# Patient Record
Sex: MALE | HISPANIC OR LATINO | Employment: FULL TIME | URBAN - METROPOLITAN AREA
[De-identification: names, ages, dates, MRNs, and addresses within clinical notes are randomized per-mention and may not be internally consistent; named-entity substitution may affect disease eponyms.]

---

## 2022-02-25 ENCOUNTER — OFFICE VISIT (OUTPATIENT)
Dept: URGENT CARE | Facility: CLINIC | Age: 37
End: 2022-02-25

## 2022-02-25 VITALS
BODY MASS INDEX: 25.1 KG/M2 | RESPIRATION RATE: 16 BRPM | DIASTOLIC BLOOD PRESSURE: 80 MMHG | SYSTOLIC BLOOD PRESSURE: 110 MMHG | HEIGHT: 66 IN | TEMPERATURE: 98.4 F | OXYGEN SATURATION: 98 % | HEART RATE: 74 BPM | WEIGHT: 156.2 LBS

## 2022-02-25 DIAGNOSIS — R21 SKIN RASH: ICD-10-CM

## 2022-02-25 DIAGNOSIS — L08.9 INFECTION OF SKIN: ICD-10-CM

## 2022-02-25 PROCEDURE — 99203 OFFICE O/P NEW LOW 30 MIN: CPT | Performed by: PHYSICIAN ASSISTANT

## 2022-02-25 RX ORDER — CEPHALEXIN 500 MG/1
500 CAPSULE ORAL 4 TIMES DAILY
Qty: 28 CAPSULE | Refills: 0 | Status: SHIPPED | OUTPATIENT
Start: 2022-02-25 | End: 2022-03-04

## 2022-02-25 ASSESSMENT — ENCOUNTER SYMPTOMS
EYE REDNESS: 0
COUGH: 0
FEVER: 0
VOMITING: 0
SORE THROAT: 0
HEADACHES: 0
SHORTNESS OF BREATH: 0
NAUSEA: 0
EYE DISCHARGE: 0

## 2022-02-25 NOTE — PROGRESS NOTES
Subjective     Nathanael Nova is a 36 y.o. male who presents with Rash (On abd and left arm x 3 months)        This is a new problem.  The patient presents to clinic complaining of a skin rash x3 months.  The patient states he developed a rash to his lower abdomen x3 months ago, which has gradually increased in size.  The patient states the rash subsequently spread to his left upper arm x2 months ago.  The patient describes the rash as itchy with intermittent stinging-like pain.  The patient also notes slight blistering to the rash with intermittent weeping of clear fluid. The patient notes slight erythema surrounding the rash.  He reports no associated swelling. The patient reports no associated fever.  The patient also reports no new exposures to soaps, lotions, or detergents.  The patient states he was seen at an outside clinic approximately 1 week ago, and was prescribed a topical antifungal cream.  The patient states since applying the antifungal cream the rash has become very scaly with cracking.  The patient has taken OTC Advil for his current symptoms.  The patient reports no history of same.  The patient states no one else in his household is experiencing a similar rash.      Rash  This is a new problem. Episode onset: x 3 months. The affected locations include the abdomen and left arm. The rash is characterized by itchiness, scaling, redness and draining. He was exposed to nothing. Pertinent negatives include no congestion, cough, fever, joint pain, shortness of breath, sore throat or vomiting. Treatments tried: The patient was previously prescribed topical antifungal cream.     PMH:  has no past medical history on file.  MEDS:   Current Outpatient Medications:   •  cyclobenzaprine (FLEXERIL) 10 MG TABS, Take 1 Tab by mouth 3 times a day as needed for Mild Pain or Muscle Spasms. (Patient not taking: Reported on 2/25/2022), Disp: 30 Tab, Rfl: 0  ALLERGIES: No Known Allergies  SURGHX: No past surgical history  "on file.  SOCHX:  reports that he has never smoked. He does not have any smokeless tobacco history on file. He reports current alcohol use. He reports that he does not use drugs.  FH: Family history was reviewed, no pertinent findings to report      Review of Systems   Constitutional: Negative for fever.   HENT: Negative for congestion, ear pain and sore throat.    Eyes: Negative for discharge and redness.   Respiratory: Negative for cough and shortness of breath.    Cardiovascular: Negative for chest pain and leg swelling.   Gastrointestinal: Negative for nausea and vomiting.   Musculoskeletal: Negative for joint pain.   Skin: Positive for rash.   Neurological: Negative for headaches.              Objective     /80   Pulse 74   Temp 36.9 °C (98.4 °F) (Temporal)   Resp 16   Ht 1.676 m (5' 6\")   Wt 70.9 kg (156 lb 3.2 oz)   SpO2 98%   BMI 25.21 kg/m²      Physical Exam  Constitutional:       General: He is not in acute distress.     Appearance: Normal appearance. He is well-developed. He is not ill-appearing.   HENT:      Head: Normocephalic and atraumatic.      Right Ear: External ear normal.      Left Ear: External ear normal.   Eyes:      Extraocular Movements: Extraocular movements intact.      Conjunctiva/sclera: Conjunctivae normal.   Cardiovascular:      Rate and Rhythm: Normal rate.   Pulmonary:      Effort: Pulmonary effort is normal.   Musculoskeletal:         General: Normal range of motion.      Cervical back: Normal range of motion and neck supple.   Skin:     General: Skin is warm and dry.             Comments: Lower Abdomen:  A localized area of scaling, hypertrophic skin is present to the patient's lower abdomen with intermittent fissures and slight weeping of clear fluid.  Erythema is present surrounding the localized area of scaling, hypertrophic skin with mild tenderness to palpation and slight increased warmth.  No swelling.  No additional rashes/lesions.  No vesicles.  No pustules.  " No signs of lymphangitis.  Periumbilical Region:  A small area of erythema is present to the infra periumbilical region with slight weeping.  No tenderness palpation.  No swelling.  No increased warmth.  No scaling/hypertrophy.  No vesicles.  No pustules.  Left Upper Arm:  A localized, circular area of scaling, hypertrophic skin is present to the left upper arm with intermittent fissures and slight weeping of clear fluid.  Erythema is present surrounding the localized area of scaling, hypertrophic skin with mild tenderness to palpation and slight increased warmth.  No swelling.  No additional rashes/lesions.  No vesicles.  No pustules.  No signs of lymphangitis.   Neurological:      Mental Status: He is alert and oriented to person, place, and time.                             Assessment & Plan          1. Skin rash  - Referral to establish with [facility]  - Referral to Dermatology    2. Infection of skin  - cephALEXin (KEFLEX) 500 MG Cap; Take 1 Capsule by mouth 4 times a day for 7 days.  Dispense: 28 Capsule; Refill: 0    The patient's presenting symptoms of physical exam endings are consistent with a skin rash.  The patient has been experiencing a skin rash for the past 3 months.  The patient's skin rash initially started on his abdomen, but have subsequently spread to the left upper arm.  The cause of the patient's skin rash is unknown at this time.  The patient was previously prescribed topical ketoconazole cream for his rash, which caused the rash to become scaly with intermittent fissures.  I am concerned the patient's skin rash may be secondarily infected at this time.  Will prescribe the patient cephalexin for his acute secondary infection.  Advised the patient to monitor worsening signs and/or symptoms.  We will place a referral to primary care, as well as dermatology, for further evaluation and management of the patient's skin rash if his symptoms do not improve with the prescribed antibiotics.   Recommend OTC medications and supportive care for symptomatic management.  Recommend the patient follow-up with primary care and dermatology as mentioned above.  Discussed return precautions with the patient, and he verbalized understanding.    Differential diagnoses, supportive care, and indications for immediate follow-up discussed with patient.   Instructed to return to clinic or nearest emergency department for any change in condition, further concerns, or worsening of symptoms.    OTC Tylenol or Motrin for fever/discomfort.  OTC antihistamines for symptomatic relief  OTC topical steroid cream for symptomatic relief  OTC moisturizer for symptomatic relief  Monitor worsening signs and or symptoms  Referral to Dermatology  Referral to Primary Care  Return to clinic or go to the ED if symptoms worsen or fail to improve, or if the patient should develop worsening/increasing/persistent rash, pain/tenderness affected area, swelling, increased redness or warmth, discharge/weeping, fever/chills, secondary signs of infection, and/or any concerning symptoms    Discussed plan with the patient, and he agrees to the above.    I personally reviewed prior external notes and test results pertinent to today's visit.  I have independently reviewed and interpreted all diagnostics ordered during this urgent care visit.     Time spent evaluating this patient was at least 30 minutes and includes preparing for visit, obtaining history, exam and evaluation, ordering labs/tests/procedures/medications, independent interpretation, and counseling/education.    Please note that this dictation was created using voice recognition software. I have made every reasonable attempt to correct obvious errors, but I expect that there may be errors of grammar and possibly content that I did not discover before finalizing the note.     This note was electronically signed by Akosua Shabazz PA-C